# Patient Record
Sex: MALE | Race: OTHER | NOT HISPANIC OR LATINO | URBAN - METROPOLITAN AREA
[De-identification: names, ages, dates, MRNs, and addresses within clinical notes are randomized per-mention and may not be internally consistent; named-entity substitution may affect disease eponyms.]

---

## 2018-01-25 ENCOUNTER — EMERGENCY (EMERGENCY)
Facility: HOSPITAL | Age: 33
LOS: 1 days | Discharge: HOME | End: 2018-01-25
Attending: EMERGENCY MEDICINE | Admitting: EMERGENCY MEDICINE
Payer: COMMERCIAL

## 2018-01-25 VITALS
HEART RATE: 97 BPM | DIASTOLIC BLOOD PRESSURE: 82 MMHG | SYSTOLIC BLOOD PRESSURE: 135 MMHG | RESPIRATION RATE: 17 BRPM | TEMPERATURE: 98 F | OXYGEN SATURATION: 99 %

## 2018-01-25 PROCEDURE — 99284 EMERGENCY DEPT VISIT MOD MDM: CPT | Mod: 25

## 2018-01-25 PROCEDURE — 99284 EMERGENCY DEPT VISIT MOD MDM: CPT

## 2018-01-25 PROCEDURE — 70450 CT HEAD/BRAIN W/O DYE: CPT | Mod: 26

## 2018-01-25 PROCEDURE — 70450 CT HEAD/BRAIN W/O DYE: CPT

## 2018-01-25 RX ORDER — ACETAMINOPHEN 500 MG
1000 TABLET ORAL ONCE
Qty: 0 | Refills: 0 | Status: DISCONTINUED | OUTPATIENT
Start: 2018-01-25 | End: 2018-01-25

## 2018-01-25 RX ORDER — ONDANSETRON 8 MG/1
4 TABLET, FILM COATED ORAL ONCE
Qty: 0 | Refills: 0 | Status: DISCONTINUED | OUTPATIENT
Start: 2018-01-25 | End: 2018-01-25

## 2018-01-25 RX ORDER — SODIUM CHLORIDE 9 MG/ML
1000 INJECTION INTRAMUSCULAR; INTRAVENOUS; SUBCUTANEOUS ONCE
Qty: 0 | Refills: 0 | Status: DISCONTINUED | OUTPATIENT
Start: 2018-01-25 | End: 2018-01-25

## 2018-01-25 RX ORDER — ONDANSETRON 8 MG/1
4 TABLET, FILM COATED ORAL ONCE
Qty: 0 | Refills: 0 | Status: COMPLETED | OUTPATIENT
Start: 2018-01-25 | End: 2018-01-25

## 2018-01-25 RX ORDER — ACETAMINOPHEN 500 MG
975 TABLET ORAL ONCE
Qty: 0 | Refills: 0 | Status: COMPLETED | OUTPATIENT
Start: 2018-01-25 | End: 2018-01-25

## 2018-01-25 RX ADMIN — Medication 975 MILLIGRAM(S): at 22:52

## 2018-01-25 RX ADMIN — ONDANSETRON 4 MILLIGRAM(S): 8 TABLET, FILM COATED ORAL at 22:39

## 2018-01-25 NOTE — ED PROVIDER NOTE - MEDICAL DECISION MAKING DETAILS
34 yo M no pmh with injury to his head during a soccer match, unknown mechanism of injury, pt can not describe what happened and does not remember any details surrounding the event. Pt is oriented x 3, HD stable, neuro exam with no focal deficits. 1 episode of emesis since the injury. Per family member, he hit another player and was on the ground for 5 min, ?LOC. Pt had altered level of alertness, per NEX[facility] II will CT and symptomatically treat headache and nausea. Suspicion for concussion and follow up with neurology or at concussion center would be appropriate. Reassess post-CT scan   Jaelyn Lewis, PGY-1 EM

## 2018-01-25 NOTE — ED PROVIDER NOTE - ATTENDING CONTRIBUTION TO CARE
34 yo M presents s/p head injury while playing soccer.  he is currently AAOX3, but does not recall anything that happened. his cousin who was watching the game said that he and another player butted heads and pt landed on the floor. unknown LOC. Patietn c/o mild headache, nausea and vomited once. no neck pain. no weakness/numbness/unsteady gait.   PE well appearing, no scalp hematoma, no spine TTP. 34 yo M presents s/p head injury while playing soccer.  he is currently AAOX3, but does not recall anything that happened around the injury. his cousin who was watching the game said that he and another player butted heads and pt landed on the floor. unknown LOC. Patietn c/o mild headache, nausea and vomited once. no neck pain. no weakness/numbness/unsteady gait.   PE well appearing, no scalp hematoma, no spine TTP. no other evidene of injury. Neurologically intact. Ambulating with steady gait.   ED workup reveals CT head with no evidence of acute traumatic injury. Patient denies any neck pain, no cervical spine tenderness to palpation, nexus negative,  no indication for imaging at this time.  Patient was treated with Motrin and Tylenol and Zofran in the ER. Patient was tolerating PO in the room without any difficulty. Neurologically intact and ambulating. Patient likely with severe concussion. Patient was given concussion instructions including rest, no work, no physical activity, no activities that requires brainpower, drink plenty of fluids, Motrin and Tylenol for pain, and follow up with and that the neurologist in 1 to 2 days for repeat evaluation and further management.  I informed him to seek concussion testing as outpatient    The patient was discharged from the ED in stable condition. All results of today's workup were discussed with the patient and all questions/concerns were addressed. All discharge instructions were thoroughly discussed with the patient, as well as important warning signs and new/ worsening symptoms which should necessitate patient's immediate return to the ED. The patient is agreeable with discharge and expresses full understanding of all instructions given.

## 2018-01-25 NOTE — ED PROVIDER NOTE - OBJECTIVE STATEMENT
34 yo M no pmh p/w head injury s/p playing in a soccer match 8:30 pm tonight. Pt currently A&OX3; however, does not recall any events of the injury, does not know how he got to the hospital. Pt is with cousin who was at the game and saw the injury happen-described 2 men were going after the ball and the next thing he saw was the patient on the floor holding his head. Was playing on a turf field. Unclear whether there was LOC, but patient was on the turf for 5 minutes. Pt was brought here by his cousin. 1 episode of emesis since the event. Pt currently complains of occipital headache, dizziness, and nausea. Denies other complaints including injury to any other area, chest pain, sob, neck pain, urinary sx, recent travel or illness.

## 2018-01-25 NOTE — ED ADULT NURSE NOTE - FALL HARM RISK CONCLUSION
----------------------------------------------------------------  Nurse Triage line:  [phone]   Call this number with any questions or concerns. You may leave a detailed message anytime. Calls are typically returned Monday through Friday between 8 AM and 4:30 PM. We usually get back to you within 2 business days depending on the issue/request.       Medication refills:    For non-narcotic medications, call your pharmacy directly to request a refill. The pharmacy will contact the [facility] for authorization. Please allow 3-4 days for these refills to be processed.     For narcotic refills, call the nurse triage line or send a De Correspondent message. Please contact us 7-10 days before your refill is due. The message MUST include the name of the specific medication(s) requested and how you would like to receive the prescription(s). The options are as follows:    [facility] staff can mail the prescription to your pharmacy. Please tell us the name of the pharmacy.    You may pick the prescription up at the Pain Clinic (tell us the location) or during a clinic visit with your pain provider    Pain Clinic staff can deliver the prescription to the [facility] pharmacy in the clinic building. Please tell us the location.      Scheduling number: [phone].  Call this number to schedule or change appointments.    We believe regular attendance is key to your success in our program.    Any time you are unable to keep your appointment we ask that you call us at least 24 hours in advance to let us know. This will allow us to offer the appointment time to another patient.     
Universal Safety Interventions

## 2018-01-25 NOTE — ED ADULT NURSE NOTE - OBJECTIVE STATEMENT
33 yr old male arrived to the ED status post head to head collision in a soccer game. per teammate pt slammed head with another player, no LOC noted, pt vomited in waiting  room. upon assessment pt is a&ox2, pupils 3mm reactive, strength equal  Jimmy. pt having difficulty recalling any of the events from today, the month, day of the week, who the president is, or vomiting in the waiting room. skin, warm, dry, and intact, family at bedside

## 2018-01-25 NOTE — ED PROVIDER NOTE - CARE PLAN
Principal Discharge DX:	Concussion with loss of consciousness of 30 minutes or less, initial encounter

## 2018-01-26 VITALS
TEMPERATURE: 98 F | OXYGEN SATURATION: 96 % | HEART RATE: 100 BPM | RESPIRATION RATE: 17 BRPM | SYSTOLIC BLOOD PRESSURE: 157 MMHG | DIASTOLIC BLOOD PRESSURE: 87 MMHG

## 2020-11-02 NOTE — ED ADULT NURSE NOTE - DURATION
Notified patient's daughter Gwendolyn to call [facility] and gave her number [phone] so she can set up delivery for patient's temodar. I also mention to her to let patient know to not take medication until she sees  and to let us know when she does receive the delivery this week.  Patient's daughter verbalized understanding.  
today

## 2021-09-03 ENCOUNTER — APPOINTMENT (OUTPATIENT)
Dept: HUMAN REPRODUCTION | Facility: CLINIC | Age: 36
End: 2021-09-03

## 2021-10-05 ENCOUNTER — APPOINTMENT (OUTPATIENT)
Dept: HUMAN REPRODUCTION | Facility: CLINIC | Age: 36
End: 2021-10-05